# Patient Record
Sex: FEMALE | Race: WHITE | NOT HISPANIC OR LATINO | URBAN - METROPOLITAN AREA
[De-identification: names, ages, dates, MRNs, and addresses within clinical notes are randomized per-mention and may not be internally consistent; named-entity substitution may affect disease eponyms.]

---

## 2021-04-19 ENCOUNTER — OFFICE VISIT (OUTPATIENT)
Dept: OBGYN CLINIC | Facility: MEDICAL CENTER | Age: 56
End: 2021-04-19
Payer: COMMERCIAL

## 2021-04-19 VITALS — SYSTOLIC BLOOD PRESSURE: 148 MMHG | DIASTOLIC BLOOD PRESSURE: 100 MMHG | WEIGHT: 205 LBS

## 2021-04-19 DIAGNOSIS — Z30.431 ENCOUNTER FOR ROUTINE CHECKING OF INTRAUTERINE CONTRACEPTIVE DEVICE (IUD): ICD-10-CM

## 2021-04-19 DIAGNOSIS — Z11.51 SCREENING FOR HPV (HUMAN PAPILLOMAVIRUS): ICD-10-CM

## 2021-04-19 DIAGNOSIS — Z00.00 HEALTHCARE MAINTENANCE: Primary | ICD-10-CM

## 2021-04-19 DIAGNOSIS — Z01.419 ENCOUNTER FOR GYNECOLOGICAL EXAMINATION (GENERAL) (ROUTINE) WITHOUT ABNORMAL FINDINGS: ICD-10-CM

## 2021-04-19 DIAGNOSIS — Z12.31 ENCOUNTER FOR SCREENING MAMMOGRAM FOR MALIGNANT NEOPLASM OF BREAST: ICD-10-CM

## 2021-04-19 DIAGNOSIS — N95.1 MENOPAUSAL SYMPTOMS: ICD-10-CM

## 2021-04-19 PROCEDURE — 87624 HPV HI-RISK TYP POOLED RSLT: CPT | Performed by: NURSE PRACTITIONER

## 2021-04-19 PROCEDURE — G0124 SCREEN C/V THIN LAYER BY MD: HCPCS | Performed by: PATHOLOGY

## 2021-04-19 PROCEDURE — G0145 SCR C/V CYTO,THINLAYER,RESCR: HCPCS | Performed by: PATHOLOGY

## 2021-04-19 PROCEDURE — S0610 ANNUAL GYNECOLOGICAL EXAMINA: HCPCS | Performed by: NURSE PRACTITIONER

## 2021-04-19 RX ORDER — LORATADINE 10 MG/1
10 TABLET ORAL
COMMUNITY

## 2021-04-19 RX ORDER — ASPIRIN 81 MG
1 TABLET, DELAYED RELEASE (ENTERIC COATED) ORAL
COMMUNITY

## 2021-04-19 RX ORDER — LEVOTHYROXINE SODIUM 88 UG/1
88 TABLET ORAL DAILY
COMMUNITY

## 2021-04-19 NOTE — ASSESSMENT & PLAN NOTE
Having hot flashes, night sweats, weight gain and difficulty sleeping  Working with  ( Enoch Rider) and on oral progesterone and getting hormonal pellets inserted in next few days  She is aware that these supplements are not FDA regulated but she has done her research and he has good reviews  She will RTO 6 months if symptoms persist to discuss HRT or prn problems or concerns

## 2021-04-19 NOTE — ASSESSMENT & PLAN NOTE
Benign findings on routine gyn exam  Recommended monthly SBE, annual CBE and annual screening mammo  ASCCP guidelines reviewed and pap with cotesting collected today; this low risk patient was advised she meets criteria to d/c pap screening at age 72  Colonoscopy referral given  The patient denies STI risk factors and declines testing at this time  Reviewed diet/activity recommendations:  Encouraged daily Ca++ and vitamin D intake as well as daily weight bearing exercise for promotion of bone health    Discussed  symptoms to report  RTO in one year for routine annual gyn exam or sooner PRN

## 2021-04-19 NOTE — ASSESSMENT & PLAN NOTE
IUD string visualized  Amenorrheic on same  Discussed removing Mirena (inserted 3/17) but she is working with  and starting hormonal pellets and wants to wait until next year to remove

## 2021-04-20 NOTE — PROGRESS NOTES
Encounter for gynecological examination (general) (routine) without abnormal findings    Benign findings on routine gyn exam  Recommended monthly SBE, annual CBE and annual screening mammo  ASCCP guidelines reviewed and pap with cotesting collected today; this low risk patient was advised she meets criteria to d/c pap screening at age 72  Colonoscopy referral given  The patient denies STI risk factors and declines testing at this time  Reviewed diet/activity recommendations:  Encouraged daily Ca++ and vitamin D intake as well as daily weight bearing exercise for promotion of bone health    Discussed  symptoms to report  RTO in one year for routine annual gyn exam or sooner PRN  Encounter for routine checking of intrauterine contraceptive device (IUD)  IUD string visualized  Amenorrheic on same  Discussed removing Mirena (inserted 3/17) but she is working with  and starting hormonal pellets and wants to wait until next year to remove  Menopausal symptoms  Having hot flashes, night sweats, weight gain and difficulty sleeping  Working with  ( Gillian Jeronimo) and on oral progesterone and getting hormonal pellets inserted in next few days  She is aware that these supplements are not FDA regulated but she has done her research and he has good reviews  She will RTO 6 months if symptoms persist to discuss HRT or prn problems or concerns  Diagnoses and all orders for this visit:    Healthcare maintenance  -     Ambulatory referral to Gastroenterology;  Future    Encounter for gynecological examination (general) (routine) without abnormal findings  -     Liquid-based pap, screening    Encounter for screening mammogram for malignant neoplasm of breast  -     Mammo screening bilateral w 3d & cad; Future    Screening for HPV (human papillomavirus)  -     Liquid-based pap, screening    Encounter for routine checking of intrauterine contraceptive device (IUD)    Menopausal symptoms      Lela Vazquez Darrian Robins  1965      CC:  Yearly exam     S:  Barbie Shepard is a 54 y o  female here for NP yearly exam  She denies breast concerns, abdominal/pelvic pain, abnormal vaginal discharge or bladder/bowel dysfunction  She has a Mirena inserted 3/17 and is amenorrheic  She believes she was getting menses when it was swapped in 2017  She is complaining of night sweats, hot flashes, weight gain and sleep issues  She wakes up at 3 AM and can't fall back to sleep  She has tried OTC products (Unisom, melatonin and benadryl) and they either are ineffective or give her nightmares  Her PCP gave her Ambien which also gave her nightmares  She states her sleep issues are affecting her ability to concentrate throughout the day  She recently started working with a  (Emile Sheets) and he started her on oral progesterone and she is planning pellets for other hormone replacements  She is aware that this is not FDA regulated  Discussed removing her IUD today given her age and menopausal symptoms but she declines  She has hx of gastric bypass and hypothyroidism    Sexual activity: She is sexually active without pain, bleeding or dryness  Contraception: She uses Mirena  for contraception  STD testing:  She does not want STD testing today        Last Pap 2/17 neg/neg  Last Mammo 3/21 TAWANA 1  Last Colonoscopy needs baseline  SBE sometimes       Family hx of breast cancer:  denies  Family hx of ovarian cancer:denies  Family hx of colon cancer: denies      Current Outpatient Medications:     Cholecalciferol (Vitamin D3) 125 MCG (5000 UT) TABS, Take 5,000 Units by mouth daily, Disp: , Rfl:     Cyanocobalamin 1000 MCG/ML LIQD, Take by mouth, Disp: , Rfl:     levonorgestrel (MIRENA) 20 MCG/24HR IUD, 1 each by Intrauterine route, Disp: , Rfl:     levothyroxine (Synthroid) 88 mcg tablet, Take 88 mcg by mouth daily, Disp: , Rfl:     loratadine (CLARITIN) 10 mg tablet, Take 10 mg by mouth, Disp: , Rfl:    multivitamin-minerals therapeutic (THERA-M), Take 1 tablet by mouth, Disp: , Rfl:     Cyanocobalamin (VITAMIN B 12 PO), Take by mouth, Disp: , Rfl:   Social History     Socioeconomic History    Marital status: Single     Spouse name: Not on file    Number of children: Not on file    Years of education: Not on file    Highest education level: Not on file   Occupational History    Not on file   Social Needs    Financial resource strain: Not on file    Food insecurity     Worry: Not on file     Inability: Not on file    Transportation needs     Medical: Not on file     Non-medical: Not on file   Tobacco Use    Smoking status: Never Smoker    Smokeless tobacco: Former User    Tobacco comment: vapes   Substance and Sexual Activity    Alcohol use: Yes     Frequency: 2-3 times a week     Drinks per session: 1 or 2     Binge frequency: Never    Drug use: Never    Sexual activity: Yes     Partners: Male     Birth control/protection: I U D     Lifestyle    Physical activity     Days per week: Not on file     Minutes per session: Not on file    Stress: Not on file   Relationships    Social connections     Talks on phone: Not on file     Gets together: Not on file     Attends [employer] witness service: Not on file     Active member of club or organization: Not on file     Attends meetings of clubs or organizations: Not on file     Relationship status: Not on file    Intimate partner violence     Fear of current or ex partner: Not on file     Emotionally abused: Not on file     Physically abused: Not on file     Forced sexual activity: Not on file   Other Topics Concern    Not on file   Social History Narrative    Not on file     Family History   Problem Relation Age of Onset    Other Mother         brain tumors    Dementia Father       Past Medical History:   Diagnosis Date    Anxiety     Depression     Hiatal hernia         Review of Systems   Constitutional: Negative for appetite change and fatigue   + for weight gain  Respiratory: Negative for shortness of breath  Cardiovascular: Negative for chest pain and leg swelling  Gastrointestinal: Negative for abdominal pain  Endocrine: Negative for cold intolerance and heat intolerance  + for hot flashes, night sweats, weight gain and sleep issues  Breasts:  Negative for tenderness, pain or masses  Genitourinary: Negative for dyspareunia, dysuria, flank pain, frequency, genital sores, hematuria, menstrual problem and pelvic pain  Musculoskeletal: Negative for back pain  Neurological: Negative for headaches  O:  Blood pressure 148/100, weight 93 kg (205 lb)  Patient appears well and is not in distress  ROM normal   Neck is supple without masses  Normal thyroid  Heart regular rate and rhythm  Lungs CTA bilaterally   Breasts are symmetrical without mass, tenderness, nipple discharge, skin changes or adenopathy  Scars noted bilaterally from prior breast reduction surgery  Abdomen is soft and nontender without masses  External genitals are normal without lesions or rashes  Urethral meatus and urethra are normal  Bladder is normal to palpation  Vagina is normal without discharge or bleeding  Cervix is normal without discharge or lesion  IUD strings visualized   Uterus is normal, mobile, nontender without palpable mass  Adnexa are normal, nontender, without palpable mass     Skin warm and dry   Capillary refill < 2 seconds  Varicose veins noted bilateral lower extremities   Alert and oriented x 3 with normal affect

## 2021-04-21 LAB
HPV HR 12 DNA CVX QL NAA+PROBE: POSITIVE
HPV16 DNA CVX QL NAA+PROBE: NEGATIVE
HPV18 DNA CVX QL NAA+PROBE: NEGATIVE

## 2021-04-23 LAB
LAB AP GYN PRIMARY INTERPRETATION: ABNORMAL
Lab: ABNORMAL
PATH INTERP SPEC-IMP: ABNORMAL

## 2021-04-25 ENCOUNTER — TELEPHONE (OUTPATIENT)
Dept: OBGYN CLINIC | Facility: MEDICAL CENTER | Age: 56
End: 2021-04-25

## 2021-04-26 NOTE — TELEPHONE ENCOUNTER
Pt contacted and informed as directed  I explained colpo process to the patient to ease her anxiety  Pt declined to schedule at this point in time as she has to look at her schedule  Pt asked for a call back number to call me back at a later time  I provided pt with my ext number

## 2021-04-29 ENCOUNTER — PROCEDURE VISIT (OUTPATIENT)
Dept: OBGYN CLINIC | Facility: MEDICAL CENTER | Age: 56
End: 2021-04-29
Payer: COMMERCIAL

## 2021-04-29 VITALS — WEIGHT: 206.4 LBS | DIASTOLIC BLOOD PRESSURE: 86 MMHG | SYSTOLIC BLOOD PRESSURE: 138 MMHG

## 2021-04-29 DIAGNOSIS — R87.610 ASCUS WITH POSITIVE HIGH RISK HPV CERVICAL: Primary | ICD-10-CM

## 2021-04-29 DIAGNOSIS — Z32.02 NEGATIVE PREGNANCY TEST: ICD-10-CM

## 2021-04-29 DIAGNOSIS — R87.810 ASCUS WITH POSITIVE HIGH RISK HPV CERVICAL: Primary | ICD-10-CM

## 2021-04-29 LAB
SL AMB  POCT GLUCOSE, UA: NORMAL
SL AMB POCT URINE PROTEIN: NORMAL

## 2021-04-29 PROCEDURE — 81002 URINALYSIS NONAUTO W/O SCOPE: CPT | Performed by: STUDENT IN AN ORGANIZED HEALTH CARE EDUCATION/TRAINING PROGRAM

## 2021-04-29 PROCEDURE — 88305 TISSUE EXAM BY PATHOLOGIST: CPT | Performed by: PATHOLOGY

## 2021-04-29 PROCEDURE — 57454 BX/CURETT OF CERVIX W/SCOPE: CPT | Performed by: STUDENT IN AN ORGANIZED HEALTH CARE EDUCATION/TRAINING PROGRAM

## 2021-04-29 NOTE — PROGRESS NOTES
Colposcopy    Date/Time: 4/29/2021 1:18 PM  Performed by: Deborah Connolly MD  Authorized by: Deborah Connolly MD     Consent:     Consent obtained:  Verbal    Consent given by:  Patient    Procedural risks discussed:  Bleeding, damage to other organs, failure rate and infection    Patient questions answered: yes      Patient agrees, verbalizes understanding, and wants to proceed: yes    Pre-procedure:     Prepped with: acetic acid    Indication:     Indication:  ASC-US (other HPV positive)  Procedure:     Procedure: Colposcopy w/ cervical biopsy and ECC      Emigrant speculum was placed in the vagina: yes      Under colposcopic examination the transition zone was seen in entirety: yes      Endocervix was curetted using a Kevorkian curette: yes      Cervical biopsy performed with a cervical biopsy punch: yes      Monsel's solution was applied: yes      Biopsy(s): yes      Location:  3, 6    Specimen to pathology: yes    Post-procedure:     Findings: Punctation      Impression: Low grade cervical dysplasia      Patient tolerance of procedure: Tolerated well, no immediate complications  Comments:      4/2021 ASCUS pos HPV other  First colpo  No h/o abnormal pap  Will call w/ results

## 2021-05-03 ENCOUNTER — TELEPHONE (OUTPATIENT)
Dept: OBGYN CLINIC | Facility: CLINIC | Age: 56
End: 2021-05-03

## 2021-05-03 NOTE — TELEPHONE ENCOUNTER
Spoke to pt and let her know that path was benign    ----- Message from Atiya Baca MD sent at 5/3/2021 10:11 AM EDT -----  Please notify - biopsies benign  Repeat pap smear in 1 yr   thanks